# Patient Record
Sex: FEMALE | Race: WHITE
[De-identification: names, ages, dates, MRNs, and addresses within clinical notes are randomized per-mention and may not be internally consistent; named-entity substitution may affect disease eponyms.]

---

## 2020-03-16 ENCOUNTER — HOSPITAL ENCOUNTER (EMERGENCY)
Dept: HOSPITAL 41 - JD.ED | Age: 55
Discharge: HOME | End: 2020-03-16
Payer: COMMERCIAL

## 2020-03-16 DIAGNOSIS — Z88.2: ICD-10-CM

## 2020-03-16 DIAGNOSIS — I10: ICD-10-CM

## 2020-03-16 DIAGNOSIS — Z88.8: ICD-10-CM

## 2020-03-16 DIAGNOSIS — R10.32: Primary | ICD-10-CM

## 2020-03-16 NOTE — US
Pelvic ultrasound: Multiple real-time images were obtained 

transvaginally.

 

Uterus is anteverted.  No myometrial abnormality is appreciated.  

Endometrial thickness is 5 mm.

 

Both right and left ovaries are not visualized on this exam.  Adnexa 

appear within normal limits.

 

Measurements:

Uterine length: 5.3 cm, AP height 2.2 cm, transverse width 2.4 cm

 

Impression:

1.  Both ovaries not visualized.  Adnexa appear unremarkable.

2.  Uterus has a normal ultrasound appearance.

 

Diagnostic code #1

 

This report was dictated in MDT

## 2020-03-16 NOTE — US
Left lower extremity deep venous ultrasound: Duplex and color Doppler 

imaging was obtained of the left common femoral, proximal greater 

saphenous, superficial femoral, popliteal, posterior tibial and 

peroneal veins.  Right common femoral vein also was evaluated.

 

Findings: Normal phasic flow, augmentation and compression is seen.

 

Impression:

1.  No evidence of deep venous thrombosis within the left lower 

extremity or within the right common femoral vein.

 

Diagnostic code #1

 

This report was dictated in MDT

## 2020-03-16 NOTE — EDM.PDOC
ED HPI GENERAL MEDICAL PROBLEM





- General


Chief Complaint: Abdominal Pain


Stated Complaint: LOWER ABDOMINAL PAIN


Time Seen by Provider: 03/16/20 12:41


Source of Information: Reports: Patient, RN Notes Reviewed


History Limitations: Reports: No Limitations





- History of Present Illness


INITIAL COMMENTS - FREE TEXT/NARRATIVE: 





Patient is a 54-year-old female who presents to the ED for the evaluation of 

some left lower groin pain.  Patient states that this is been present for a few 

months, notes that it first started last June 2018.  She notes that there is 

some mild pain, and at times very intense cramping sensations.  Patient states 

that she is menopausal, but relates it to ovulation type pain.  She states that 

the pain is so bad at times, that she has to double over to help relieve the 

pain.  Patient notes that she has chronic low back pain as well, she is not 

having any chest pain or shortness of breath with this.  She is not having any 

vaginal discharge, out of the norm for her, but states that her urine does 

smell a little bit more strong than it normally does, she is not complaining of 

any dysuria, frequency or urgency.  Patient states that when she used the 

bathroom this morning, she wiped, and thought she felt a lump, around the size 

of a walnut, this was not painful or tender.  She notes this was on the left 

side of her vagina.  There is no bleeding, and states she is not having any 

diarrhea.  She notes that her last good bowel movement was the day before 

yesterday, so 2 days ago.  Patient states that she does have unprotected 

monogamous sex with her , and is not noticed any dyspareunia, but states 

that her  told her that things felt tighter down there a few days ago.  

She has not seen her primary care provider for this.  The patient does recount, 

that she fell a while back, and thought she may have burst of varicose vein on 

her left leg, and was wondering if it could not be caused from this trauma.  

Patient's provider is Odette Ochoa.


Treatments PTA: Reports: Other (see below)


Other Treatments PTA: went to walk in and says she was turned away


  ** Left Groin


Pain Score (Numeric/FACES): 4





- Related Data


 Allergies











Allergy/AdvReac Type Severity Reaction Status Date / Time


 


nitrofurantoin Allergy Mild Rash Verified 03/16/20 12:46





[From Macrobid]     


 


Sulfa (Sulfonamide Allergy  Rash Verified 03/16/20 12:46





Antibiotics)     


 


spermacide Allergy  Burning Uncoded 03/16/20 12:46














Past Medical History


Cardiovascular History: Reports: Hypertension


Respiratory History: Reports: Other (See Below)


Other Respiratory History: seasonal allergies


Gastrointestinal History: Reports: Chronic Constipation


OB/GYN History: Reports: Other (See Below)


Other OB/GYN History: cone biopsy; LEEP surgery


Psychiatric History: Reports: Depression





- Past Surgical History


Female  Surgical History: Reports: LEEP, Other (See Below) (Cone biopsy)





Social & Family History





- Tobacco Use


Smoking Status *Q: Never Smoker





- Caffeine Use


Caffeine Use: Reports: Soda





- Recreational Drug Use


Recreational Drug Use: No





ED ROS GENERAL





- Review of Systems


Review Of Systems: See Below


Constitutional: Denies: Fever, Chills


Respiratory: Denies: Shortness of Breath


Cardiovascular: Denies: Chest Pain


GI/Abdominal: Denies: Abdominal Pain, Constipation, Diarrhea, Nausea, Vomiting


: Reports: Other (strong smelling urine).  Denies: Discharge, Dysuria, 

Frequency, Urgency


Musculoskeletal: Reports: Back Pain (chronic low back pain), Leg Pain (left 

proximal leg pain/groin pain)


Neurological: Denies: Numbness, Tingling





ED EXAM, GI/ABD





- Physical Exam


Exam: See Below


Exam Limited By: No Limitations


General Appearance: Alert, WD/WN, No Apparent Distress


Eyes: Bilateral: Normal Appearance


Ears: Normal External Exam


Nose: Normal Inspection


Throat/Mouth: Normal Inspection, Normal Lips, Normal Teeth, Normal Gums, Normal 

Oropharynx, Normal Voice, No Airway Compromise


Head: Atraumatic, Normocephalic


Neck: Normal Inspection


Respiratory/Chest: No Respiratory Distress, Lungs Clear, Normal Breath Sounds, 

No Accessory Muscle Use, Chest Non-Tender


Cardiovascular: Normal Peripheral Pulses, Regular Rate, Rhythm, No Murmur


GI/Abdominal Exam: Normal Bowel Sounds, Soft, Non-Tender, No Distention, No Mass


 (Female) Exam: Normal External Exam, Normal Bimanual Exam, Adnexal 

Tenderness (Left sided tenderness), Vaginal Discharge (normal physiologic in 

appearance).  No: Cervix Motion Tenderness, Vaginal Bleeding


Extremities: Normal Inspection, Normal Capillary Refill, Other (tenderness 

noted in Left groin/anterior hip)


Neurological: Alert, Oriented, Normal Cognition, No Motor/Sensory Deficits


Psychiatric: Normal Affect, Normal Mood


Skin Exam: Warm, Dry, Intact, Normal Color, No Rash





Course





- Vital Signs


Last Recorded V/S: 


 Last Vital Signs











Temp  98.0 F   03/16/20 12:51


 


Pulse  71   03/16/20 12:51


 


Resp  20   03/16/20 12:51


 


BP  150/91 H  03/16/20 12:51


 


Pulse Ox  94 L  03/16/20 12:51














- Orders/Labs/Meds


Labs: 


 Laboratory Tests











  03/16/20 03/16/20 03/16/20 Range/Units





  15:23 15:23 15:27 


 


WBC  6.60    (3.98-10.04)  K/mm3


 


RBC  4.41    (3.98-5.22)  M/mm3


 


Hgb  13.4    (11.2-15.7)  gm/dl


 


Hct  43.2    (34.1-44.9)  %


 


MCV  98.0 H    (79.4-94.8)  fl


 


MCH  30.4    (25.6-32.2)  pg


 


MCHC  31.0 L    (32.2-35.5)  g/dl


 


RDW Std Deviation  47.4 H    (36.4-46.3)  fL


 


Plt Count  337    (182-369)  K/mm3


 


MPV  9.7    (9.4-12.3)  fl


 


Neut % (Auto)  60.3    (34.0-71.1)  %


 


Lymph % (Auto)  29.1    (19.3-51.7)  %


 


Mono % (Auto)  7.7    (4.7-12.5)  %


 


Eos % (Auto)  2.3    (0.7-5.8)  


 


Baso % (Auto)  0.6    (0.1-1.2)  %


 


Neut # (Auto)  3.98    (1.56-6.13)  K/mm3


 


Lymph # (Auto)  1.92    (1.18-3.74)  K/mm3


 


Mono # (Auto)  0.51 H    (0.24-0.36)  K/mm3


 


Eos # (Auto)  0.15    (0.04-0.36)  K/mm3


 


Baso # (Auto)  0.04    (0.01-0.08)  K/mm3


 


Sodium   141   (136-145)  mEq/L


 


Potassium   4.3   (3.5-5.1)  mEq/L


 


Chloride   103   ()  mEq/L


 


Carbon Dioxide   28   (21-32)  mEq/L


 


Anion Gap   14.3   (5-15)  


 


BUN   15   (7-18)  mg/dL


 


Creatinine   0.9   (0.55-1.02)  mg/dL


 


Est Cr Clr Drug Dosing   66.12   mL/min


 


Estimated GFR (MDRD)   > 60   (>60)  mL/min


 


BUN/Creatinine Ratio   16.7   (14-18)  


 


Glucose   100   ()  mg/dL


 


Calcium   9.1   (8.5-10.1)  mg/dL


 


Total Bilirubin   0.5   (0.2-1.0)  mg/dL


 


AST   16   (15-37)  U/L


 


ALT   29   (14-59)  U/L


 


Alkaline Phosphatase   68   ()  U/L


 


Total Protein   8.3 H   (6.4-8.2)  g/dl


 


Albumin   4.1   (3.4-5.0)  g/dl


 


Globulin   4.2   gm/dL


 


Albumin/Globulin Ratio   1.0   (1-2)  


 


Urine Color    Yellow  (Yellow)  


 


Urine Appearance    Clear  (Clear)  


 


Urine pH    7.0  (5.0-8.0)  


 


Ur Specific Gravity    1.025  (1.005-1.030)  


 


Urine Protein    Negative  (Negative)  


 


Urine Glucose (UA)    Negative  (Negative)  


 


Urine Ketones    Negative  (Negative)  


 


Urine Occult Blood    Negative  (Negative)  


 


Urine Nitrite    Negative  (Negative)  


 


Urine Bilirubin    Negative  (Negative)  


 


Urine Urobilinogen    0.2  (0.2-1.0)  


 


Ur Leukocyte Esterase    Negative  (Negative)  


 


Urine RBC    0-5  (0-5)  /hpf


 


Urine WBC    0-5  (0-5)  /hpf


 


Ur Squamous Epith Cells    5-10 H  (0-5)  /hpf


 


Urine Bacteria    Few  (FEW)  /hpf


 


Urine Mucus    Few  (FEW)  /hpf














- Re-Assessments/Exams


Free Text/Narrative Re-Assessment/Exam: 





03/16/20 14:09


Patient presents to the ED for evaluation of her ongoing left groin/proximal 

leg pain.  Have ordered ultrasound of the area to determine versus hernia/clot/

ovarian cyst at this time.  Patient is not having any obvious pain at rest, but 

states it does worsen sometimes when she stands on her left leg.  I will also 

order UA to UTI at this time.





03/16/20 15:15


Patient's ultrasound demonstrated no sign of a DVT in the left leg, the 

transvaginal ultrasound could not visualize ovaries, but the adnexa appear 

unremarkable and the uterus has a normal ultrasound appearance.  Ordered CBC 

and CMP to make sure there is no sign of metabolic abnormality or bacterial 

infection.  Etiology of the patient's complaint is still undetermined at this 

time.





Departure





- Departure


Time of Disposition: 16:22


Disposition: Home, Self-Care 01


Condition: Fair


Clinical Impression: 


 Left groin pain








- Discharge Information


*PRESCRIPTION DRUG MONITORING PROGRAM REVIEWED*: No


*COPY OF PRESCRIPTION DRUG MONITORING REPORT IN PATIENT FRANCISCO: No


Instructions:  Pain Without a Known Cause


Referrals: 


Odette Ochoa PA-C [Primary Care Provider] - 


Forms:  ED Department Discharge


Additional Instructions: 


You were evaluated in the ER today for your left lower groin pain.





Laboratory evaluation and ultrasounds demonstrated no focal abnormalities.  You 

are not suffering from a blood clot, or any other lower abdominal 

abnormalities.  The ovaries were not greatly visualized on the ultrasound, so 

the possibility of an ovarian cyst cannot be ruled out at this time.





Recommend that you follow-up with your regular care provider, for a possible 

outpatient abdominal pelvis CT with contrast for further evaluation of your 

left lower groin/abdominal pain.





There were also no physical abnormalities noted on your pelvic exam at today's 

visit.  If you should desire, an OB/GYN appointment, for further evaluation 

might be warranted.





As for the pain, you can try 500 mg Tylenol or 600 mg ibuprofen every 6 hours 

as needed for further pain relief, do not exceed 4000 mg Tylenol or 3200 mg 

ibuprofen in a 24-hour time span.  You may also try heat packs to the area to 

see if this does not provide further relief from the pain.





Please return to the ER at any time if symptoms change or worsen.





Sepsis Event Note





- Evaluation


Sepsis Screening Result: No Definite Risk





- Focused Exam


Vital Signs: 


 Vital Signs











  Temp Pulse Resp BP Pulse Ox


 


 03/16/20 12:51  98.0 F  71  20  150/91 H  94 L











Date Exam was Performed: 03/16/20


Time Exam was Performed: 16:22

## 2021-05-25 ENCOUNTER — HOSPITAL ENCOUNTER (EMERGENCY)
Dept: HOSPITAL 41 - JD.ED | Age: 56
Discharge: HOME | End: 2021-05-25
Payer: COMMERCIAL

## 2021-05-25 DIAGNOSIS — M54.5: Primary | ICD-10-CM

## 2021-05-25 DIAGNOSIS — I10: ICD-10-CM

## 2021-05-25 DIAGNOSIS — Z88.8: ICD-10-CM

## 2021-05-25 DIAGNOSIS — Z88.2: ICD-10-CM

## 2021-05-25 NOTE — EDM.PDOC
ED HPI GENERAL MEDICAL PROBLEM





- General


Chief Complaint: Back Pain or Injury


Stated Complaint: [facility]


Time Seen by Provider: 05/25/21 10:31


Source of Information: Reports: Patient


History Limitations: Reports: No Limitations





- History of Present Illness


INITIAL COMMENTS - FREE TEXT/NARRATIVE: 





55-year-old female presents to the emergency department today by [facility] with complaints of low back pain.  Patient states that she was flying 

home 5 days ago when she states she started to feel her low back "tighten up ". 

She states that it has progressively gotten worse to today when she has extreme 

difficulty ambulating from the kitchen back to bed and then has difficulty 

laying in the bed.  She denies any previous surgeries or traumatic injuries to 

her back.  She states about 5 years ago she had an issue where she twisted wrong

and ended up in the emergency department for evaluation of back pain.  She 

states she recently had surgery on her right ankle and contributes some of this 

back discomfort to ambulating using a walking device.  She denies falling 

recently.  Patient received fentanyl in route to the hospital.


  ** Back


Pain Score (Numeric/FACES): 2





- Related Data


                                    Allergies











Allergy/AdvReac Type Severity Reaction Status Date / Time


 


nitrofurantoin Allergy Mild Rash Verified 05/25/21 10:39





[From Macrobid]     


 


Sulfa (Sulfonamide Allergy  Rash Verified 05/25/21 10:39





Antibiotics)     


 


spermacide Allergy  Burning Uncoded 03/16/20 12:46











Home Meds: 


                                    Home Meds





oxyCODONE HCl/Acetaminophen [Percocet 5-325 mg Tablet] 1 each PO Q6H PRN #10 

tablet 05/25/21 [Rx]











Past Medical History


Cardiovascular History: Reports: Hypertension


Respiratory History: Reports: Other (See Below)


Other Respiratory History: seasonal allergies


Gastrointestinal History: Reports: Chronic Constipation


OB/GYN History: Reports: Other (See Below)


Other OB/GYN History: cone biopsy; LEEP surgery


Psychiatric History: Reports: Depression





- Past Surgical History


Female  Surgical History: Reports: LEEP





Social & Family History





- Tobacco Use


Tobacco Use Status *Q: Never Tobacco User





- Caffeine Use


Caffeine Use: Reports: Soda





ED ROS GENERAL





- Review of Systems


Review Of Systems: Comprehensive ROS is negative, except as noted in HPI.





ED EXAM,LOWER BACK PAIN/INJURY





- Physical Exam


Exam: See Below


Exam Limited By: No Limitations


General Appearance: Alert, WD/WN, No Apparent Distress


Ears: Normal External Exam, Hearing Grossly Normal


Nose: Normal Inspection


Throat/Mouth: Normal Inspection, Normal Lips, Normal Voice, No Airway Compromise


Head: Atraumatic


Neck: Normal Inspection, Supple, Non-Tender, Full Range of Motion.  No: Tender 

Lateral, Tender Midline


Respiratory/Chest: No Respiratory Distress, No Accessory Muscle Use


Cardiovascular: Normal Peripheral Pulses, Regular Rate, Rhythm


GI/Abdominal: No Distention


 (Female) Exam: Deferred


Rectal (Female) Exam: Deferred


Back Exam: Normal Inspection, Full Range of Motion, Other (Pain is noted on the 

right lateral low back.  There is no spinal or paraspinal tenderness.  Pain is 

noted in the right piriformis area.).  No: Muscle Spasm, Paraspinal Tenderness, 

Vertebral Tenderness


Extremities: Normal Inspection, Pedal Edema (1+ pedal edema noted to bilateral 

lower extremities)


Neurological: Alert, Normal Mood/Affect, Normal Gait, Oriented x 3


Psychiatric: Normal Affect, Normal Mood


Skin Exam: Warm, Dry, Intact, Normal Color, No Rash


Lymphatic: No Adenopathy





Course





- Vital Signs


Text/Narrative:: 





Patient presents with low back pain that she states started about 5 days ago but

 has progressively gotten worse.  She states she noticed it when flying home 5 

days ago.  She states that yesterday it became difficult to ambulate from the 

kitchen to her room and then lay down in her bed.  She has no spinal tenderness 

noted.  She has no paraspinal tenderness noted.  Pain is noted on the right 

lateral low back.  There is no spinal or paraspinal tenderness.  Pain is noted 

in the right piriformis area.  She states she has pain on both sides however it 

significantly worse on the right side.  She states that her ankle surgery was on

 the right ankle.  At the time of my assessment she states she has minimal low 

back pain as she received fentanyl in route.  She also states she took 2 

Percocet at approximately 930 this morning.  She was able to sit up on the side 

of the bed and stand up with minimal discomfort.  She is able to bear weight 

independently on each leg.  She denies any shooting pain or tenderness down the 

back of her thighs.  I have ordered for the patient to receive a x-ray of the 

lumbar spine.  At this time I do not think that it is pain related to spinous 

process, I believe it is more muscular pain.


Last Recorded V/S: 


                                Last Vital Signs











Temp  98.3 F   05/25/21 10:36


 


Pulse  76   05/25/21 10:36


 


Resp  16   05/25/21 10:36


 


BP  162/76 H  05/25/21 10:36


 


Pulse Ox  97   05/25/21 10:36














- Orders/Labs/Meds


Meds: 


Medications














Discontinued Medications














Generic Name Dose Route Start Last Admin





  Trade Name Freq  PRN Reason Stop Dose Admin


 


Ketorolac Tromethamine  30 mg  05/25/21 11:54 





  Ketorolac 30 Mg/Ml Sdv  IVPUSH  05/25/21 11:55 





  ONETIME ONE  


 


Orphenadrine Citrate  100 mg  05/25/21 11:54 





  Orphenadrine 100 Mg Tab.Er  PO  05/25/21 11:55 





  NOW STA  














- Re-Assessments/Exams


Free Text/Narrative Re-Assessment/Exam: 





05/25/21 11:45


Radiologist impression AP, lateral and coned-down lateral views centered to the 

lumbosacral junction: 1.  Minimal disc space narrowing is noted posteriorly at 

the L5-S1.


2.  3 view lumbar spine study is otherwise unremarkable.





Patient will be discharged home.


05/25/21 11:55


Pt is requesting pain meds prior to discharge.  I have ordered Toradol IV and 

norflex.  





Departure





- Departure


Time of Disposition: 11:45


Disposition: Home, Self-Care 01


Condition: Good


Clinical Impression: 


Back pain


Qualifiers:


 Back pain location: low back pain Chronicity: acute Back pain laterality: 

bilateral Sciatica presence: without sciatica Qualified Code(s): M54.5 - Low 

back pain








- Discharge Information


Prescriptions: 


oxyCODONE HCl/Acetaminophen [Percocet 5-325 mg Tablet] 1 each PO Q6H PRN #10 

tablet


 PRN Reason: Pain (Moderate 4-6)


Instructions:  Acute Back Pain, Adult, Pain Medicine Instructions, Easy-to-Read


Referrals: 


Odette Ochoa PA-C [Primary Care Provider] - 


Forms:  ED Department Discharge


Additional Instructions: 


You were seen in the emergency department today with complaints of low back pain

that started about 5 days ago.  In route to the hospital via the ambulance you 

did receive IV pain medication and he stated that your pain was significantly 

better while being treated in the emergency department.  X-rays of your low back

were completed and there was nothing acute appreciated per the radiologist [email or web address] [email or web address].  I will send a prescription to your pharmacy for Percocet you can take 1 

tab every 6 hours as needed for more severe pain however for the next 48 hours I

recommend that you alternate ibuprofen 600 mg with Tylenol 650 mg every 4 hours.

 I would recommend that you ice the affected area 30 minutes at a time every 3 

hours while awake.  You will likely start to feel better in about the next 48 h

ours.  Also recommend that if pain has not resolved, you follow-up with your 

primary care provider.





Sepsis Event Note (ED)





- Evaluation


Sepsis Screening Result: No Definite Risk





- Focused Exam


Vital Signs: 


                                   Vital Signs











  Temp Pulse Resp BP Pulse Ox


 


 05/25/21 10:36  98.3 F  76  16  162/76 H  97

## 2021-05-25 NOTE — CR
Lumbar spine: AP, lateral and coned-down lateral views centered to the

 lumbosacral junction were obtained.

 

Comparison: No prior lumbar spine imaging is available.

 

Minimal disc space narrowing is seen posteriorly at L5-S1.  Other disc

 spaces are maintained.  Vertebral body heights are maintained.  

Pedicles are intact.  Visualized transverse and spinous processes are 

intact.  Sacroiliac joints appear within normal limits.

 

Impression:

1.  Minimal disc space narrowing is noted posteriorly at L5-S1.

2.  Three-view lumbar spine study is otherwise unremarkable.

 

Diagnostic code #2

## 2022-01-15 ENCOUNTER — HOSPITAL ENCOUNTER (EMERGENCY)
Dept: HOSPITAL 41 - JD.ED | Age: 57
Discharge: HOME | End: 2022-01-15
Payer: COMMERCIAL

## 2022-01-15 DIAGNOSIS — N39.0: Primary | ICD-10-CM

## 2022-01-15 DIAGNOSIS — I10: ICD-10-CM

## 2022-01-15 DIAGNOSIS — Z88.1: ICD-10-CM

## 2022-01-15 DIAGNOSIS — Z88.2: ICD-10-CM

## 2022-01-15 DIAGNOSIS — Z88.8: ICD-10-CM

## 2022-01-15 PROCEDURE — 36415 COLL VENOUS BLD VENIPUNCTURE: CPT

## 2022-01-15 PROCEDURE — 81001 URINALYSIS AUTO W/SCOPE: CPT

## 2022-01-15 PROCEDURE — 80053 COMPREHEN METABOLIC PANEL: CPT

## 2022-01-15 PROCEDURE — 96375 TX/PRO/DX INJ NEW DRUG ADDON: CPT

## 2022-01-15 PROCEDURE — 87088 URINE BACTERIA CULTURE: CPT

## 2022-01-15 PROCEDURE — 87086 URINE CULTURE/COLONY COUNT: CPT

## 2022-01-15 PROCEDURE — 96365 THER/PROPH/DIAG IV INF INIT: CPT

## 2022-01-15 PROCEDURE — 87186 SC STD MICRODIL/AGAR DIL: CPT

## 2022-01-15 PROCEDURE — 99284 EMERGENCY DEPT VISIT MOD MDM: CPT

## 2022-01-15 PROCEDURE — 85025 COMPLETE CBC W/AUTO DIFF WBC: CPT
